# Patient Record
Sex: MALE | Race: BLACK OR AFRICAN AMERICAN | NOT HISPANIC OR LATINO | Employment: FULL TIME | URBAN - METROPOLITAN AREA
[De-identification: names, ages, dates, MRNs, and addresses within clinical notes are randomized per-mention and may not be internally consistent; named-entity substitution may affect disease eponyms.]

---

## 2018-02-09 ENCOUNTER — OFFICE VISIT (OUTPATIENT)
Dept: URGENT CARE | Facility: CLINIC | Age: 28
End: 2018-02-09
Payer: COMMERCIAL

## 2018-02-09 VITALS
OXYGEN SATURATION: 98 % | DIASTOLIC BLOOD PRESSURE: 76 MMHG | HEART RATE: 65 BPM | WEIGHT: 160 LBS | BODY MASS INDEX: 21.67 KG/M2 | SYSTOLIC BLOOD PRESSURE: 138 MMHG | TEMPERATURE: 98 F | HEIGHT: 72 IN

## 2018-02-09 DIAGNOSIS — S80.01XA CONTUSION OF RIGHT KNEE, INITIAL ENCOUNTER: Primary | ICD-10-CM

## 2018-02-09 DIAGNOSIS — S83.91XA SPRAIN OF RIGHT KNEE, UNSPECIFIED LIGAMENT, INITIAL ENCOUNTER: ICD-10-CM

## 2018-02-09 PROCEDURE — 3008F BODY MASS INDEX DOCD: CPT | Mod: S$GLB,,, | Performed by: NURSE PRACTITIONER

## 2018-02-09 PROCEDURE — 99203 OFFICE O/P NEW LOW 30 MIN: CPT | Mod: S$GLB,,, | Performed by: NURSE PRACTITIONER

## 2018-02-09 RX ORDER — MELOXICAM 15 MG/1
TABLET ORAL
COMMUNITY
Start: 2017-12-09 | End: 2018-02-09

## 2018-02-09 RX ORDER — IBUPROFEN 200 MG
800 TABLET ORAL
COMMUNITY
Start: 2018-02-09 | End: 2018-10-01

## 2018-02-09 NOTE — PROGRESS NOTES
Subjective:       Patient ID: Herb Bateman Jr. is a 27 y.o. male.    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 He states his right knee buckled, and he fell to the floor. Pt c/o right knee pain radiating to lower leg that is worse with weight bearing, and right knee medial swelling. Pt denies previous right knee injury. For treatment, pt took ibuprofen, elevation ,and ice with no relief. Current pain 7/10 on pain scale. ajd  He was struck on the lateral aspect of his right knee but now the medial aspect is painful and swollen. No previous knee injury. Denies other injuries.      Knee Pain    The incident occurred 12 to 24 hours ago. The incident occurred at work. The injury mechanism was a fall. The pain is present in the right knee. The quality of the pain is described as aching. The pain is at a severity of 7/10. The pain is moderate. The pain has been constant since onset. Pertinent negatives include no numbness. It is unknown if a foreign body is present. The symptoms are aggravated by weight bearing and movement. He has tried NSAIDs, ice and elevation for the symptoms. The treatment provided no relief.     Review of Systems   Constitution: Negative for chills, fever and weakness.   HENT: Negative for congestion, ear pain and nosebleeds.    Eyes: Negative for blurred vision and pain.   Cardiovascular: Negative for chest pain and palpitations.   Respiratory: Negative for cough, shortness of breath and wheezing.    Skin: Negative for dry skin, itching and rash.   Musculoskeletal: Positive for joint pain and joint swelling. Negative for arthritis, back pain, gout, muscle weakness, neck pain and stiffness.   Gastrointestinal: Negative for abdominal pain, constipation, diarrhea, nausea and vomiting.   Genitourinary: Negative for dysuria, frequency and hematuria.    Neurological: Negative for dizziness, headaches, numbness and seizures.   Allergic/Immunologic: Negative for hives.   All other systems reviewed and are negative.      Objective:      Physical Exam   Constitutional: He is oriented to person, place, and time. He appears well-developed and well-nourished. No distress.   HENT:   Right Ear: External ear normal.   Left Ear: External ear normal.   Nose: Nose normal.   Eyes: Conjunctivae are normal.   Cardiovascular: Normal rate, regular rhythm, normal heart sounds and intact distal pulses.    Pulmonary/Chest: Effort normal and breath sounds normal.   Abdominal: Soft. Bowel sounds are normal.   Musculoskeletal: He exhibits tenderness.        Right knee: He exhibits swelling. He exhibits normal range of motion, no ecchymosis, no deformity, no erythema and no LCL laxity. Tenderness found. Medial joint line tenderness noted.        Legs:  Pain and swelling to medial aspect of right knee. C/o pain with flexion. Neg ant/post drawer. Some popping noted with ROM.    Neurological: He is alert and oriented to person, place, and time.   Skin: Skin is warm and dry. Capillary refill takes less than 2 seconds. No erythema.   Psychiatric: He has a normal mood and affect. His behavior is normal.       Assessment:       1. Contusion of right knee, initial encounter    2. Sprain of right knee, unspecified ligament, initial encounter        Plan:       Narrative     2 views: No fracture dislocation bone destruction seen.      Electronically signed by: ELDON FONTAINE MD  Date: 02/09/18  Time: 11:02     Encounter     View Encounter          Signed by     Signed Credentials Date/Time  Phone Pager   ELDON FONTAINE III, MD 2/09/2018 11:02         [place] with Jorge A  [place] [facility] [place]    Medications Ordered This Encounter      ibuprofen (ADVIL) 200 MG tablet          Sig: Take 4 tablets (800 mg total) by mouth 3 (three) times daily with meals.  Patient Instructions: Attention  not to aggravate affected area, Apply ice 24-48 hours then apply heat/warm soaks, Elevated affected area   Restrictions: Regular Duty Patient of off the schedule until Sunday night.  Follow-up in about 5 days (around 2/14/2018).    Elastic bandage to secure ice pack.  Do not take any other NSAIDS (ex. Aleve, Advil, Mobic) with the ibuprofen.

## 2018-02-09 NOTE — LETTER
[facility]  [address], [address]  [place] [facility] [place] [facility] [place] [address]  Phone: [phone]  Fax: [phone]    Pt Name: Herb Bateman Jr.  Injury Date: 02/08/2018   Employee ID:[id number] Date of First Treatment: 02/09/2018   Company: [email or web address]            Appointment Time:  Arrived:  9:50 AM CST   Physician: Emelina Ortiz NP Time out: 11:33 AM       Office Treatment: Herb was seen today for knee pain.    Diagnoses and all orders for this visit:    Contusion of right knee, initial encounter  -     X-Ray Knee 3 View Right  -     Over the counter (ADVIL) 200 MG tablet; Take 4 tablets (800 mg total) by mouth 3 (three) times daily with meals.    Sprain of right knee, unspecified ligament, initial encounter       Patient Instructions: Apply ice 24-48 hours then apply heat/warm soaks, Elevated affected area      Restrictions: NONE  Attention not to aggravate affected area  Regular Duty       Return Appointment: 2/14/2018 at 1:00 PM

## 2018-02-14 ENCOUNTER — OFFICE VISIT (OUTPATIENT)
Dept: URGENT CARE | Facility: CLINIC | Age: 28
End: 2018-02-14
Payer: COMMERCIAL

## 2018-02-14 DIAGNOSIS — S83.411D SPRAIN OF MEDIAL COLLATERAL LIGAMENT OF RIGHT KNEE, SUBSEQUENT ENCOUNTER: ICD-10-CM

## 2018-02-14 DIAGNOSIS — S80.01XD CONTUSION OF RIGHT KNEE, SUBSEQUENT ENCOUNTER: Primary | ICD-10-CM

## 2018-02-14 PROCEDURE — 3008F BODY MASS INDEX DOCD: CPT | Mod: S$GLB,,, | Performed by: NURSE PRACTITIONER

## 2018-02-14 PROCEDURE — 99213 OFFICE O/P EST LOW 20 MIN: CPT | Mod: S$GLB,,, | Performed by: NURSE PRACTITIONER

## 2018-02-14 NOTE — LETTER
[facility]  [address], [address]  [place] [address]  Phone: [phone]  Fax: [phone]    Pt Name: Herb Bateman Jr.  Injury Date: 02/08/2018   Employee ID: [id number] Date02/14/2018   Company: [employer]            Appointment Time: 1:00 PM Arrived:  1:00 PM CST   Physician: Emelina Ortiz NP Time out: 1:32 PM       Office Treatment: Herb was seen today for knee pain.    Diagnoses and all orders for this visit:    Contusion of right knee, subsequent encounter    Sprain of medial collateral ligament of right knee, subsequent encounter       Patient Instructions: Daily home exercises/warm soaks      Restrictions:  Attention not to aggravate affected area,  Regular Duty       Return Appointment: 2/21/2018 at 3:00 PM

## 2018-02-14 NOTE — PROGRESS NOTES
"Subjective:       Patient ID: Herb Bateman Jr. is a 27 y.o. male.    Chief Complaint: Knee Pain (right)    F/u for right knee pain (doi 2/8/2018) Pt reports he is "getting better" Pt continues to have pain with certain movements, prolonged standing, and sitting. He states that his knee linda sometime while walking. For treatment, pt takes ibuprofen on schedule, ices, warm soaks, and elevation with moderate relief. Current pain 6/10 on pain scale. ajd       Knee Pain    The incident occurred 5 to 7 days ago. The incident occurred at work. The injury mechanism was a fall. The pain is present in the right knee. The quality of the pain is described as aching. The pain is at a severity of 6/10. The pain is moderate. The pain has been intermittent since onset. Pertinent negatives include no numbness. He reports no foreign bodies present. The symptoms are aggravated by movement and weight bearing. He has tried NSAIDs, ice, heat and elevation for the symptoms. The treatment provided moderate relief.     Review of Systems   Constitution: Negative for chills, fever and weakness.   HENT: Negative for congestion, ear pain and nosebleeds.    Eyes: Negative for blurred vision and pain.   Cardiovascular: Negative for chest pain and palpitations.   Respiratory: Negative for cough, shortness of breath and wheezing.    Skin: Negative for dry skin, itching and rash.   Musculoskeletal: Positive for joint pain, joint swelling and muscle weakness. Negative for arthritis, back pain, gout, neck pain and stiffness.   Gastrointestinal: Negative for abdominal pain, constipation, diarrhea, nausea and vomiting.   Genitourinary: Negative for dysuria, frequency and hematuria.   Neurological: Negative for dizziness, headaches, numbness and seizures.   Allergic/Immunologic: Negative for hives.   All other systems reviewed and are negative.      Objective:      Physical Exam   Constitutional: He is oriented to person, place, and time. He appears " well-developed and well-nourished. No distress.   HENT:   Right Ear: External ear normal.   Left Ear: External ear normal.   Nose: Nose normal.   Eyes: Conjunctivae are normal.   Cardiovascular: Intact distal pulses.    Pulses:       Dorsalis pedis pulses are 2+ on the right side, and 2+ on the left side.        Posterior tibial pulses are 2+ on the right side, and 2+ on the left side.   Pulmonary/Chest: Effort normal.   Musculoskeletal: He exhibits tenderness.        Right knee: He exhibits swelling. He exhibits normal range of motion, no ecchymosis, no erythema, normal alignment and no LCL laxity. Tenderness found.        Legs:  TTP to medial aspect right knee. Remains swollen to medial aspect of right knee. Pain increased with flexion. No heat. Anterior/posterior drawer negative. Some popping with ROM. Neurovascular intact distally. Dr Woo also examined patient.   Neurological: He is alert and oriented to person, place, and time.   Skin: Skin is warm and dry. Capillary refill takes less than 2 seconds. No erythema.   Psychiatric: He has a normal mood and affect. His behavior is normal.       Assessment:       1. Contusion of right knee, subsequent encounter    2. Sprain of medial collateral ligament of right knee, subsequent encounter        Plan:       Continue Ibuprofen as instructed.     Patient Instructions: Attention not to aggravate affected area, Daily home exercises/warm soaks   Restrictions: Regular Duty  Follow-up in about 1 week (around 2/21/2018).

## 2018-02-21 ENCOUNTER — OFFICE VISIT (OUTPATIENT)
Dept: URGENT CARE | Facility: CLINIC | Age: 28
End: 2018-02-21
Payer: COMMERCIAL

## 2018-02-21 DIAGNOSIS — S83.411D SPRAIN OF MEDIAL COLLATERAL LIGAMENT OF RIGHT KNEE, SUBSEQUENT ENCOUNTER: ICD-10-CM

## 2018-02-21 DIAGNOSIS — S80.01XD CONTUSION OF RIGHT KNEE, SUBSEQUENT ENCOUNTER: Primary | ICD-10-CM

## 2018-02-21 PROCEDURE — 3008F BODY MASS INDEX DOCD: CPT | Mod: S$GLB,,, | Performed by: NURSE PRACTITIONER

## 2018-02-21 PROCEDURE — 99213 OFFICE O/P EST LOW 20 MIN: CPT | Mod: S$GLB,,, | Performed by: NURSE PRACTITIONER

## 2018-02-21 NOTE — PROGRESS NOTES
"Subjective:       Patient ID: Herb Bateman Jr. is a 27 y.o. male.    Chief Complaint: Knee Pain (right)    F/u for right knee pain (doi 2/8/2018) Pt states he experiences "minimal" pain while walking, and knee tenderness with palpation. ajd Has been mostly sitting at work. Has not "tested" knee. Is improving but activity has been very limited. Sometimes has swelling in the evening, applies ice then soaks with Epsom salts and does ROM exercises. Not taking Advil. MWT      Knee Pain    The incident occurred more than 1 week ago. The incident occurred at work. The injury mechanism was a direct blow. The pain is present in the right knee. The quality of the pain is described as aching. The pain is mild. The pain has been intermittent since onset. Pertinent negatives include no numbness. He reports no foreign bodies present. The symptoms are aggravated by weight bearing.     Review of Systems   Constitution: Negative for chills, fever and weakness.   HENT: Negative for congestion, ear pain and nosebleeds.    Eyes: Negative for blurred vision and pain.   Cardiovascular: Negative for chest pain and palpitations.   Respiratory: Negative for cough, shortness of breath and wheezing.    Skin: Negative for dry skin, itching and rash.   Musculoskeletal: Positive for joint pain. Negative for arthritis, back pain, gout, joint swelling, muscle weakness, neck pain and stiffness.        Occasional popping right knee.   Gastrointestinal: Negative for abdominal pain, constipation, diarrhea, nausea and vomiting.   Genitourinary: Negative for dysuria, frequency and hematuria.   Neurological: Negative for dizziness, headaches, numbness and seizures.   Allergic/Immunologic: Negative for hives.   All other systems reviewed and are negative.      Objective:      Physical Exam   Constitutional: He is oriented to person, place, and time. He appears well-developed and well-nourished. No distress.   HENT:   Right Ear: External ear normal.   Left " Ear: External ear normal.   Nose: Nose normal.   Eyes: Conjunctivae are normal.   Cardiovascular: Intact distal pulses.    Pulmonary/Chest: Effort normal.   Musculoskeletal: Normal range of motion.        Right knee: He exhibits swelling. He exhibits normal range of motion, no ecchymosis, no erythema and no LCL laxity. No tenderness found.        Legs:  Continues to have some swelling to right medial knee but much less than in previous weeks. Ambulating well, squats and climbs well. Some popping noted with Matthew's but no pain.   Neurological: He is alert and oriented to person, place, and time.   Skin: Skin is warm and dry. Capillary refill takes less than 2 seconds. No erythema.   Psychiatric: He has a normal mood and affect. His behavior is normal.       Assessment:       1. Contusion of right knee, subsequent encounter    2. Sprain of medial collateral ligament of right knee, subsequent encounter        Plan:            Patient Instructions: Attention not to aggravate affected area, Daily home exercises/warm soaks   Restrictions: Regular Duty  Follow-up in about 1 week (around 2/28/2018).    Patient will progressively increase activity, take OTC ibuprofen (advil) as needed.

## 2018-02-21 NOTE — LETTER
[facility]  [address], [address]  [place] [address]  Phone: [phone]  Fax: [phone]    Pt Name: Herb Bateman Jr.  Injury Date: 02/08/2018   Employee ID: [id number] Date 02/21/2018   Company: [employer]            Appointment Time: 3:00 PM Arrived:  3:00 PM CST   Physician: Emelina Ortiz NP Time out: 4:10 PM       Office Treatment: Herb was seen today for knee pain.    Diagnoses and all orders for this visit:    Contusion of right knee, subsequent encounter    Sprain of medial collateral ligament of right knee, subsequent encounter       Patient Instructions: Attention not to aggravate affected area, Daily home exercises/warm soaks      Restrictions: Attention not to aggravate  Regular Duty       Return Appointment: 2/28/2018 at 3:15 PM

## 2018-03-12 ENCOUNTER — TELEPHONE (OUTPATIENT)
Dept: URGENT CARE | Facility: CLINIC | Age: 28
End: 2018-03-12

## 2018-10-01 ENCOUNTER — HOSPITAL ENCOUNTER (EMERGENCY)
Facility: HOSPITAL | Age: 28
Discharge: HOME | End: 2018-10-01
Attending: EMERGENCY MEDICINE

## 2018-10-01 VITALS
TEMPERATURE: 98 F | OXYGEN SATURATION: 100 % | RESPIRATION RATE: 14 BRPM | BODY MASS INDEX: 22.35 KG/M2 | DIASTOLIC BLOOD PRESSURE: 86 MMHG | WEIGHT: 165 LBS | HEART RATE: 59 BPM | HEIGHT: 72 IN | SYSTOLIC BLOOD PRESSURE: 125 MMHG

## 2018-10-01 DIAGNOSIS — S16.1XXA NECK STRAIN, INITIAL ENCOUNTER: Primary | ICD-10-CM

## 2018-10-01 PROCEDURE — 96372 THER/PROPH/DIAG INJ SC/IM: CPT

## 2018-10-01 PROCEDURE — 99283 EMERGENCY DEPT VISIT LOW MDM: CPT | Mod: ,,, | Performed by: PHYSICIAN ASSISTANT

## 2018-10-01 PROCEDURE — 99284 EMERGENCY DEPT VISIT MOD MDM: CPT | Mod: 25

## 2018-10-01 PROCEDURE — 63600175 PHARM REV CODE 636 W HCPCS: Performed by: PHYSICIAN ASSISTANT

## 2018-10-01 RX ORDER — ORPHENADRINE CITRATE 30 MG/ML
30 INJECTION INTRAMUSCULAR; INTRAVENOUS
Status: COMPLETED | OUTPATIENT
Start: 2018-10-01 | End: 2018-10-01

## 2018-10-01 RX ORDER — METHOCARBAMOL 750 MG/1
1500 TABLET, FILM COATED ORAL 3 TIMES DAILY PRN
Qty: 15 TABLET | Refills: 0 | Status: SHIPPED | OUTPATIENT
Start: 2018-10-01 | End: 2022-03-10

## 2018-10-01 RX ORDER — KETOROLAC TROMETHAMINE 30 MG/ML
10 INJECTION, SOLUTION INTRAMUSCULAR; INTRAVENOUS
Status: COMPLETED | OUTPATIENT
Start: 2018-10-01 | End: 2018-10-01

## 2018-10-01 RX ORDER — NAPROXEN 500 MG/1
500 TABLET ORAL 2 TIMES DAILY WITH MEALS
Qty: 20 TABLET | Refills: 0 | OUTPATIENT
Start: 2018-10-01 | End: 2021-03-06

## 2018-10-01 RX ADMIN — KETOROLAC TROMETHAMINE 10 MG: 30 INJECTION, SOLUTION INTRAMUSCULAR at 07:10

## 2018-10-01 RX ADMIN — ORPHENADRINE CITRATE 30 MG: 30 INJECTION INTRAMUSCULAR; INTRAVENOUS at 07:10

## 2018-10-01 NOTE — DISCHARGE INSTRUCTIONS
Continue to take nonsteroidal anti-inflammatories (naproxen) 2 times a day which is every 12 hr as needed with meals for pain relief.  Take muscle relaxer (Robaxin) 3 times a day which is every 8 hr as needed for muscle spasms.  Do not take muscle relaxer if you plan on working, driving, or operating any other heavy machinery as the medication can be sedating.      No future appointments.    Our goal in the emergency department is to always give you outstanding care and exceptional service. You may receive a survey by mail or e-mail in the next week regarding your experience in our ED. We would greatly appreciate your completing and returning the survey. Your feedback provides us with a way to recognize our staff who give very good care and it helps us learn how to improve when your experience was below our aspiration of excellence.

## 2018-10-01 NOTE — ED NOTES
LOC: The patient is awake and alert; oriented x 3 and speaking appropriately.  APPEARANCE: Patient resting comfortably, patient is clean and well groomed  SKIN: warm and dry, normal skin turgor & moist mucus membranes, skin intact, no breakdown noted.  MUSCULOSKELETAL: Patient moving all extremities well, no obvious swelling or deformities noted.C/O neck pain x 3 days.   RESPIRATORY: Airway is open and patent, respirations are spontaneous, normal effort and rate  CARDIAC: Patient has a normal rate, no peripheral edema noted, capillary refill < 3 seconds; No complaints of chest pain   ABDOMEN: Soft and non tender to palpation, denies abd pain .

## 2018-10-01 NOTE — ED TRIAGE NOTES
Pain in neck x 3 days - denies trauma.  Awakened w/ pain 3 weeks ago. Denies weakness in extremities or numbness/ tingling.

## 2018-10-01 NOTE — ED PROVIDER NOTES
Encounter Date: 10/1/2018       History     Chief Complaint   Patient presents with    Neck Pain     Patient reports neck pain x 3 days, denies trauma     7:11 AM  Patient is a 28-year-old male with no significant past medical history who presents the ED with neck pain. Patient states he notices pain Friday, 3 days ago.  He does not recall any significant injury or trauma, but recently moved.  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  He complains of moderate pain to his posterior neck and bilateral upper shoulders that is worse with movement.  He has difficult time ranging his neck secondary to the pain. He denies any fever, chills, headache, dizziness, blurred vision, diplopia, chest pain, or any other difficulties.  No extremity paresthesias or weakness. He has been applying icy Hot and heat and has tried Tylenol and Advil without resolution of his symptoms.          Review of patient's allergies indicates:  No Known Allergies  History reviewed. No pertinent past medical history.  History reviewed. No pertinent surgical history.  Family History   Problem Relation Age of Onset    Stroke Mother     Diabetes Maternal Grandmother      Social History     Tobacco Use    Smoking status: Current Every Day Smoker     Types: Cigarettes    Smokeless tobacco: Never Used   Substance Use Topics    Alcohol use: No    Drug use: No     Review of Systems   Constitutional: Negative for chills and fever.   HENT: Negative for ear pain and sore throat.    Eyes: Negative for photophobia, redness and visual disturbance.   Respiratory: Negative for cough and shortness of breath.    Cardiovascular: Negative for chest pain.   Gastrointestinal: Negative for abdominal pain and nausea.   Endocrine: Negative for polyuria.   Genitourinary: Negative for dysuria and penile pain.   Musculoskeletal: Positive for neck pain. Negative for back pain.   Skin: Negative for rash.   Neurological: Negative for weakness, numbness  and headaches.   Hematological: Does not bruise/bleed easily.       Physical Exam     Initial Vitals [10/01/18 0648]   BP Pulse Resp Temp SpO2   129/76 70 18 97.5 °F (36.4 °C) 99 %      MAP       --         Physical Exam    Vitals reviewed.  Constitutional: He appears well-developed and well-nourished. He is not diaphoretic. No distress.   HENT:   Head: Normocephalic and atraumatic.   Nose: Nose normal.   Eyes: Conjunctivae and EOM are normal.   Neck: Muscular tenderness present. No spinous process tenderness present. No neck rigidity.   Cardiovascular: Normal rate, regular rhythm and normal heart sounds. Exam reveals no friction rub.    No murmur heard.  Pulmonary/Chest: Breath sounds normal. No respiratory distress. He has no wheezes. He has no rales.   Abdominal: Soft. Bowel sounds are normal. He exhibits no distension. There is no tenderness.   Musculoskeletal: Normal range of motion.        Back:    No C, T, or L spinal tenderness. FROM of bilateral upper extremities with 5/5 strength intact.    Neurological: He is alert and oriented to person, place, and time. He has normal strength. No sensory deficit.   Skin: Skin is warm and dry. No erythema.   Psychiatric: He has a normal mood and affect. Thought content normal.         ED Course   Procedures  Labs Reviewed - No data to display       Imaging Results    None          Medical Decision Making:   History:   Old Medical Records: I decided to obtain old medical records.       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    I have considered but do not suspect cervical fracture/dislocation, meningitis, or carotid disease. He is afebrile and without neuro deficits. No meningeal signs.     Given history and physical exam, his symptoms are most likely due to neck strain.  I will give patient Toradol and Norflex IM injection here.  I will prescribe  extra-strength NSAID (naproxen) and Robaxin as needed for his symptoms. He was educated on use.  He is to call and establish care with PCP and follow up if his symptoms do not improve or worsen.  I have reviewed patient's chart and discussed this case with my supervising MD.        Fabienne Moon PA-C  Emergent Department  [facility]  [facility] #[id number] or #[id number]                   Clinical Impression:   The encounter diagnosis was Neck strain, initial encounter.      Disposition:   Disposition: Discharged  Condition: Stable                        Fabienne Moon PA-C  10/01/18 1807

## 2018-11-15 ENCOUNTER — OCCUPATIONAL HEALTH (OUTPATIENT)
Dept: URGENT CARE | Facility: CLINIC | Age: 28
End: 2018-11-15

## 2018-11-15 DIAGNOSIS — Z02.1 PRE-EMPLOYMENT EXAMINATION: Primary | ICD-10-CM

## 2018-11-15 PROCEDURE — 99080 SPECIAL REPORTS OR FORMS: CPT | Mod: S$GLB,,, | Performed by: NURSE PRACTITIONER

## 2018-11-15 PROCEDURE — 99499 UNLISTED E&M SERVICE: CPT | Mod: S$GLB,,, | Performed by: NURSE PRACTITIONER

## 2018-11-15 PROCEDURE — 80305 DRUG TEST PRSMV DIR OPT OBS: CPT | Mod: S$GLB,,, | Performed by: NURSE PRACTITIONER

## 2018-11-15 PROCEDURE — 94010 BREATHING CAPACITY TEST: CPT | Mod: S$GLB,,, | Performed by: NURSE PRACTITIONER

## 2018-11-15 PROCEDURE — 97750 PHYSICAL PERFORMANCE TEST: CPT | Mod: S$GLB,,, | Performed by: NURSE PRACTITIONER

## 2019-09-12 ENCOUNTER — HOSPITAL ENCOUNTER (EMERGENCY)
Facility: HOSPITAL | Age: 29
Discharge: HOME | End: 2019-09-12
Attending: EMERGENCY MEDICINE

## 2019-09-12 VITALS
RESPIRATION RATE: 18 BRPM | WEIGHT: 155 LBS | TEMPERATURE: 98 F | DIASTOLIC BLOOD PRESSURE: 71 MMHG | HEIGHT: 71 IN | OXYGEN SATURATION: 94 % | HEART RATE: 61 BPM | BODY MASS INDEX: 21.7 KG/M2 | SYSTOLIC BLOOD PRESSURE: 125 MMHG

## 2019-09-12 DIAGNOSIS — R10.13 EPIGASTRIC PAIN: ICD-10-CM

## 2019-09-12 DIAGNOSIS — K29.00 ACUTE GASTRITIS, PRESENCE OF BLEEDING UNSPECIFIED, UNSPECIFIED GASTRITIS TYPE: Primary | ICD-10-CM

## 2019-09-12 LAB
ALBUMIN SERPL BCP-MCNC: 4.4 G/DL (ref 3.5–5.2)
ALP SERPL-CCNC: 53 U/L (ref 55–135)
ALT SERPL W/O P-5'-P-CCNC: 17 U/L (ref 10–44)
ANION GAP SERPL CALC-SCNC: 10 MMOL/L (ref 8–16)
AST SERPL-CCNC: 29 U/L (ref 10–40)
BASOPHILS # BLD AUTO: 0.06 K/UL (ref 0–0.2)
BASOPHILS NFR BLD: 0.8 % (ref 0–1.9)
BILIRUB SERPL-MCNC: 0.4 MG/DL (ref 0.1–1)
BILIRUB UR QL STRIP: NEGATIVE
BUN SERPL-MCNC: 22 MG/DL (ref 6–20)
CALCIUM SERPL-MCNC: 9.6 MG/DL (ref 8.7–10.5)
CHLORIDE SERPL-SCNC: 105 MMOL/L (ref 95–110)
CLARITY UR REFRACT.AUTO: CLEAR
CO2 SERPL-SCNC: 24 MMOL/L (ref 23–29)
COLOR UR AUTO: NORMAL
CREAT SERPL-MCNC: 1.2 MG/DL (ref 0.5–1.4)
DIFFERENTIAL METHOD: ABNORMAL
EOSINOPHIL # BLD AUTO: 0.2 K/UL (ref 0–0.5)
EOSINOPHIL NFR BLD: 2.4 % (ref 0–8)
ERYTHROCYTE [DISTWIDTH] IN BLOOD BY AUTOMATED COUNT: 15.7 % (ref 11.5–14.5)
EST. GFR  (AFRICAN AMERICAN): >60 ML/MIN/1.73 M^2
EST. GFR  (NON AFRICAN AMERICAN): >60 ML/MIN/1.73 M^2
GLUCOSE SERPL-MCNC: 122 MG/DL (ref 70–110)
GLUCOSE UR QL STRIP: NEGATIVE
HCT VFR BLD AUTO: 43.2 % (ref 40–54)
HGB BLD-MCNC: 13.4 G/DL (ref 14–18)
HGB UR QL STRIP: NEGATIVE
IMM GRANULOCYTES # BLD AUTO: 0.02 K/UL (ref 0–0.04)
IMM GRANULOCYTES NFR BLD AUTO: 0.3 % (ref 0–0.5)
KETONES UR QL STRIP: NEGATIVE
LEUKOCYTE ESTERASE UR QL STRIP: NEGATIVE
LIPASE SERPL-CCNC: 24 U/L (ref 4–60)
LYMPHOCYTES # BLD AUTO: 2.4 K/UL (ref 1–4.8)
LYMPHOCYTES NFR BLD: 32.5 % (ref 18–48)
MCH RBC QN AUTO: 25.7 PG (ref 27–31)
MCHC RBC AUTO-ENTMCNC: 31 G/DL (ref 32–36)
MCV RBC AUTO: 83 FL (ref 82–98)
MONOCYTES # BLD AUTO: 0.7 K/UL (ref 0.3–1)
MONOCYTES NFR BLD: 9.8 % (ref 4–15)
NEUTROPHILS # BLD AUTO: 4.1 K/UL (ref 1.8–7.7)
NEUTROPHILS NFR BLD: 54.2 % (ref 38–73)
NITRITE UR QL STRIP: NEGATIVE
NRBC BLD-RTO: 0 /100 WBC
PH UR STRIP: 7 [PH] (ref 5–8)
PLATELET # BLD AUTO: 208 K/UL (ref 150–350)
PMV BLD AUTO: 10.2 FL (ref 9.2–12.9)
POTASSIUM SERPL-SCNC: 4.7 MMOL/L (ref 3.5–5.1)
PROT SERPL-MCNC: 8 G/DL (ref 6–8.4)
PROT UR QL STRIP: NEGATIVE
RBC # BLD AUTO: 5.22 M/UL (ref 4.6–6.2)
SODIUM SERPL-SCNC: 139 MMOL/L (ref 136–145)
SP GR UR STRIP: 1.02 (ref 1–1.03)
URN SPEC COLLECT METH UR: NORMAL
WBC # BLD AUTO: 7.47 K/UL (ref 3.9–12.7)

## 2019-09-12 PROCEDURE — 96375 TX/PRO/DX INJ NEW DRUG ADDON: CPT

## 2019-09-12 PROCEDURE — 99285 PR EMERGENCY DEPT VISIT,LEVEL V: ICD-10-PCS | Mod: ,,, | Performed by: EMERGENCY MEDICINE

## 2019-09-12 PROCEDURE — 93010 ELECTROCARDIOGRAM REPORT: CPT | Mod: ,,, | Performed by: INTERNAL MEDICINE

## 2019-09-12 PROCEDURE — 96374 THER/PROPH/DIAG INJ IV PUSH: CPT

## 2019-09-12 PROCEDURE — 99285 EMERGENCY DEPT VISIT HI MDM: CPT | Mod: 25

## 2019-09-12 PROCEDURE — 85025 COMPLETE CBC W/AUTO DIFF WBC: CPT

## 2019-09-12 PROCEDURE — 63600175 PHARM REV CODE 636 W HCPCS: Performed by: EMERGENCY MEDICINE

## 2019-09-12 PROCEDURE — 93010 EKG 12-LEAD: ICD-10-PCS | Mod: ,,, | Performed by: INTERNAL MEDICINE

## 2019-09-12 PROCEDURE — 83690 ASSAY OF LIPASE: CPT

## 2019-09-12 PROCEDURE — 25000003 PHARM REV CODE 250: Performed by: EMERGENCY MEDICINE

## 2019-09-12 PROCEDURE — 96361 HYDRATE IV INFUSION ADD-ON: CPT

## 2019-09-12 PROCEDURE — 99285 EMERGENCY DEPT VISIT HI MDM: CPT | Mod: ,,, | Performed by: EMERGENCY MEDICINE

## 2019-09-12 PROCEDURE — 93005 ELECTROCARDIOGRAM TRACING: CPT

## 2019-09-12 PROCEDURE — 81003 URINALYSIS AUTO W/O SCOPE: CPT

## 2019-09-12 PROCEDURE — 80053 COMPREHEN METABOLIC PANEL: CPT

## 2019-09-12 PROCEDURE — S0028 INJECTION, FAMOTIDINE, 20 MG: HCPCS | Performed by: EMERGENCY MEDICINE

## 2019-09-12 RX ORDER — ETODOLAC 400 MG/1
TABLET, FILM COATED ORAL
COMMUNITY
Start: 2018-06-06 | End: 2022-03-14

## 2019-09-12 RX ORDER — ONDANSETRON 2 MG/ML
4 INJECTION INTRAMUSCULAR; INTRAVENOUS
Status: COMPLETED | OUTPATIENT
Start: 2019-09-12 | End: 2019-09-12

## 2019-09-12 RX ORDER — KETOROLAC TROMETHAMINE 30 MG/ML
10 INJECTION, SOLUTION INTRAMUSCULAR; INTRAVENOUS
Status: DISCONTINUED | OUTPATIENT
Start: 2019-09-12 | End: 2019-09-12

## 2019-09-12 RX ORDER — PANTOPRAZOLE SODIUM 20 MG/1
20 TABLET, DELAYED RELEASE ORAL DAILY
Qty: 30 TABLET | Refills: 1 | Status: SHIPPED | OUTPATIENT
Start: 2019-09-12 | End: 2022-03-10

## 2019-09-12 RX ORDER — HYDROCODONE BITARTRATE AND ACETAMINOPHEN 5; 325 MG/1; MG/1
1 TABLET ORAL
Status: DISCONTINUED | OUTPATIENT
Start: 2019-09-12 | End: 2019-09-12

## 2019-09-12 RX ORDER — FAMOTIDINE 10 MG/ML
20 INJECTION INTRAVENOUS
Status: COMPLETED | OUTPATIENT
Start: 2019-09-12 | End: 2019-09-12

## 2019-09-12 RX ORDER — ONDANSETRON 4 MG/1
4 TABLET, ORALLY DISINTEGRATING ORAL EVERY 6 HOURS PRN
Qty: 15 TABLET | Refills: 0 | Status: SHIPPED | OUTPATIENT
Start: 2019-09-12 | End: 2022-03-10

## 2019-09-12 RX ORDER — FAMOTIDINE 20 MG/1
20 TABLET, FILM COATED ORAL 2 TIMES DAILY
Qty: 40 TABLET | Refills: 1 | Status: SHIPPED | OUTPATIENT
Start: 2019-09-12 | End: 2020-09-11

## 2019-09-12 RX ADMIN — ONDANSETRON 4 MG: 2 INJECTION INTRAMUSCULAR; INTRAVENOUS at 09:09

## 2019-09-12 RX ADMIN — FAMOTIDINE 20 MG: 10 INJECTION, SOLUTION INTRAVENOUS at 09:09

## 2019-09-12 RX ADMIN — SODIUM CHLORIDE 1000 ML: 0.9 INJECTION, SOLUTION INTRAVENOUS at 09:09

## 2019-09-12 NOTE — ED NOTES
"Patient identifiers verified and correct for Mr Bateman  C/C: Mid upper kristine abd pain SEE NN  APPEARANCE: awake and alert in NAD.  SKIN: warm, dry and intact. No breakdown or bruising.  MUSCULOSKELETAL: Patient moving all extremities spontaneously, no obvious swelling or deformities noted. Ambulates independently.  RESPIRATORY: Denies shortness of breath.Respirations unlabored. Denies fevers  CARDIAC: Denies CP, 2+ distal pulses; no peripheral edema  ABDOMEN: Abdomen soft, pain to kristine upper abdomen, denies nausea, denies vomiting, BM this am "normal"   : voids spontaneously, denies difficulty  Neurologic: AAO x 4; follows commands equal strength in all extremities; denies numbness/tingling. Denies dizziness Denies weakness    "

## 2019-09-12 NOTE — DISCHARGE INSTRUCTIONS
Please schedule an appointment with your primary care doctor for follow-up.  Have attached a list of clinics where you can be seen without insurance if you do not have a doctor.  If you start to have any new or worsening symptoms, please come back to the emergency department.

## 2019-09-12 NOTE — ED PROVIDER NOTES
Encounter Date: 9/12/2019       History     Chief Complaint   Patient presents with    Abdominal Pain     without N/V/D. Started this AM     28-year-old male with no known past medical history presents for sudden onset epigastric abdominal pain for 3 hr.  He reports the pain feels like squeezing, is intermittent, severe and nonradiating.  Pain was somewhat worse after eating breakfast.  He reports associated brief nausea.  Denies vomiting, changes in bowel movements,        Review of patient's allergies indicates:  No Known Allergies  History reviewed. No pertinent past medical history.  History reviewed. No pertinent surgical history.  Family History   Problem Relation Age of Onset    Stroke Mother     Diabetes Maternal Grandmother      Social History     Tobacco Use    Smoking status: Current Every Day Smoker     Types: Cigarettes    Smokeless tobacco: Never Used   Substance Use Topics    Alcohol use: No    Drug use: No     Review of Systems   Constitutional: Negative for appetite change, chills, diaphoresis, fatigue and fever.   Respiratory: Negative for cough and shortness of breath.    Cardiovascular: Negative for chest pain and palpitations.   Gastrointestinal: Positive for abdominal pain and nausea. Negative for abdominal distention, anal bleeding, blood in stool, constipation, diarrhea, rectal pain and vomiting.   Endocrine: Negative for polydipsia and polyuria.   Genitourinary: Negative for dysuria, flank pain, hematuria, scrotal swelling, testicular pain and urgency.   Musculoskeletal: Negative for back pain and myalgias.   Skin: Negative for color change.   Allergic/Immunologic: Negative for food allergies and immunocompromised state.   Neurological: Negative for light-headedness and headaches.       Physical Exam     Initial Vitals [09/12/19 0838]   BP Pulse Resp Temp SpO2   (!) 153/90 (!) 53 18 97.8 °F (36.6 °C) 100 %      MAP       --         Physical Exam    Vitals reviewed.  Constitutional:  He appears well-developed and well-nourished.  Non-toxic appearance. He does not appear ill. No distress.   Family members at bedside   HENT:   Head: Normocephalic and atraumatic.   Mouth/Throat: Oropharynx is clear and moist. No oropharyngeal exudate.   Eyes: EOM are normal. Pupils are equal, round, and reactive to light. No scleral icterus.   Neck: Normal range of motion. Neck supple.   Cardiovascular: Normal rate, regular rhythm, normal heart sounds and intact distal pulses. Exam reveals no gallop and no friction rub.    No murmur heard.  Pulmonary/Chest: Effort normal and breath sounds normal. No stridor. No respiratory distress. He has no wheezes. He has no rhonchi. He has no rales. He exhibits no tenderness.   Abdominal: Soft. Normal appearance and bowel sounds are normal. He exhibits no mass. There is no hepatosplenomegaly. There is tenderness in the right upper quadrant and epigastric area. There is no rigidity, no rebound, no guarding, no CVA tenderness, no tenderness at McBurney's point and negative Drake's sign.   Musculoskeletal: Normal range of motion.   Neurological: He is alert and oriented to person, place, and time.   Skin: Skin is warm.         ED Course   Procedures  Labs Reviewed - No data to display  EKG Readings: (Independently Interpreted)   Initial Reading: No STEMI. Rhythm: Sinus Bradycardia. Heart Rate: 51. Ectopy: No Ectopy. Conduction: 1st Degree AV Block. ST Segments: Normal ST Segments. T Waves: Normal. Clinical Impression: Sinus Bradycardia       Imaging Results          US Abdomen Limited (Gallbladder) (Final result)  Result time 09/12/19 10:59:29    Final result by Mansoor Esquivel MD (09/12/19 10:59:29)                 Impression:      1. A small polyp versus adherent gallstone within the gallbladder.  2. No sonographic evidence of cholecystitis.    Electronically signed by resident: Niraj Nuñez  Date:    09/12/2019  Time:    10:39    Electronically signed by: Mansoor  MD Alvin  Date:    09/12/2019  Time:    10:59             Narrative:    EXAMINATION:  US ABDOMEN LIMITED    CLINICAL HISTORY:  Epigastric pain    TECHNIQUE:  Limited ultrasound of the right upper quadrant of the abdomen (including pancreas, liver, gallbladder, common bile duct, and spleen) was performed.    COMPARISON:  None.    FINDINGS:  Liver: Partially visualized and demonstrates homogeneous echotexture. No focal hepatic lesions.    Gallbladder: A small polyp versus adherent gallstone is identified, measures 0.2 x 0.2 x 0.2 cm.  No calculi, wall thickening, or pericholecystic fluid.  No sonographic Drake's sign.    Biliary system: The common duct is not dilated, measuring 2 mm.  No intrahepatic ductal dilatation.    Miscellaneous: No upper abdominal ascites.                                 Medical Decision Making:   History:   Old Medical Records: I decided to obtain old medical records.  Old Records Summarized: records from previous admission(s).       <> Summary of Records: Last ED visit was in October of 2018 for neck strain  Initial Assessment:   28-year-old male presenting for acute onset of epigastric and right upper quadrant pain. On ED arrival, he is mildly bradycardic and slightly hypertensive otherwise normal vitals.  He does have some tenderness in his epigastrium right upper quadrant but negative Drake sign.  Differential Diagnosis:   Cholecystitis  Biliary colic  Gastritis  Pancreatitis  Doubt cardiac etiology  Independently Interpreted Test(s):   I have ordered and independently interpreted EKG Reading(s) - see prior notes  Clinical Tests:   Lab Tests: Ordered and Reviewed  Radiological Study: Ordered and Reviewed  Medical Tests: Ordered and Reviewed  ED Management:  28-year-old male presenting for acute onset of epigastric pain for gallbladder pathology versus gastritis.  Will check labs, give Pepcid, Zofran, fluids, do right upper quadrant ultrasound reassess.    On reassessment, the patient  vomited once but reports improvement of his pain after vomiting. Lab workup is unrevealing.  Ultrasound shows small polyp versus adherent gallstone in the gallbladder with no signs of cholecystitis.  I discussed this finding with the patient, suspect that his symptoms are due to gastritis rather than gallbladder pathology.  Given reassuring workup, do not believe he requires further ED treatment is stable for discharge. Will discharge with Pepcid, Protonix, Zofran and instruct patient to follow up with PCP.  Referral information provided for primary care clinics. Stressed the importance of follow-up, strict ED return precautions given.  Patient voiced understanding and is comfortable with discharge. I discussed this patient with my supervising physician.    Tresa Ross PA-C                        Clinical Impression:       ICD-10-CM ICD-9-CM   1. Acute gastritis, presence of bleeding unspecified, unspecified gastritis type K29.00 535.00   2. Epigastric pain R10.13 789.06         Disposition:   Disposition: Discharged  Condition: Stable                        Tresa Ross PA-C  09/12/19 1707

## 2019-09-12 NOTE — ED TRIAGE NOTES
"Patient states intermittent kristine upper abd pain onset 0600, denies nausea or vomting, BM this am "normal"  Denies fevers, No OTC meds  "